# Patient Record
Sex: FEMALE | Race: WHITE | Employment: UNEMPLOYED | URBAN - METROPOLITAN AREA
[De-identification: names, ages, dates, MRNs, and addresses within clinical notes are randomized per-mention and may not be internally consistent; named-entity substitution may affect disease eponyms.]

---

## 2018-11-06 ENCOUNTER — OFFICE VISIT (OUTPATIENT)
Dept: FAMILY MEDICINE CLINIC | Facility: CLINIC | Age: 28
End: 2018-11-06
Payer: MEDICAID

## 2018-11-06 VITALS
TEMPERATURE: 99 F | SYSTOLIC BLOOD PRESSURE: 120 MMHG | HEART RATE: 98 BPM | WEIGHT: 199 LBS | DIASTOLIC BLOOD PRESSURE: 70 MMHG | OXYGEN SATURATION: 97 %

## 2018-11-06 DIAGNOSIS — J03.90 EXUDATIVE TONSILLITIS: Primary | ICD-10-CM

## 2018-11-06 PROCEDURE — 99203 OFFICE O/P NEW LOW 30 MIN: CPT | Performed by: PHYSICIAN ASSISTANT

## 2018-11-06 PROCEDURE — 87880 STREP A ASSAY W/OPTIC: CPT | Performed by: PHYSICIAN ASSISTANT

## 2018-11-06 RX ORDER — AMOXICILLIN 500 MG/1
500 CAPSULE ORAL 2 TIMES DAILY
Qty: 20 CAPSULE | Refills: 0 | Status: SHIPPED | OUTPATIENT
Start: 2018-11-06 | End: 2018-11-16

## 2018-11-06 NOTE — PROGRESS NOTES
CHIEF COMPLAINT:   Patient presents with:  Sore Throat: x 1 day. HPI:   Mukesh Theodore is a 33year old female who presents for upper respiratory symptoms for  1 days. Patient reports sore throat, malaise/fatigue. Acute onset.   (+) chills/sweats or edema  LYMPH:  (+) ant cervical LAD, no posterior cervical LAD    ASSESSMENT AND PLAN:   Marvin Argueta is a 33year old female who presents with upper respiratory symptoms that are consistent with    ASSESSMENT:   Exudative tonsillitis  (primary enc to avoid dehydration. Because fever can increase fluid loss and painful swallowing can decrease fluid intake, measures must be taken to avoid dehydration.  Choose high-quality fluids such as warm soup broth (which replaces both salt and water loss) and suga

## 2018-11-06 NOTE — PATIENT INSTRUCTIONS
1.  Amoxicillin 500 mg twice daily for 10 days. 2.  Viscous lidocaine as prescribed. Swish, gargle and spit out medication (do not swallow). Check the temperature of foods and liquids prior to ingestion to avoid burns.   Use caution when chewing food to drinks with anyone to avoid spread of illness  · Follow up in 3-5 days if not improving, condition worsens, or fever greater than or equal to 100.4 persists for 72 hours.     · Be sure to finish entire course of antibiotics to reduce risk of reinfection or

## 2019-02-02 ENCOUNTER — OFFICE VISIT (OUTPATIENT)
Dept: FAMILY MEDICINE CLINIC | Facility: CLINIC | Age: 29
End: 2019-02-02
Payer: MEDICAID

## 2019-02-02 VITALS
RESPIRATION RATE: 18 BRPM | DIASTOLIC BLOOD PRESSURE: 64 MMHG | TEMPERATURE: 98 F | HEART RATE: 72 BPM | SYSTOLIC BLOOD PRESSURE: 110 MMHG | OXYGEN SATURATION: 98 %

## 2019-02-02 DIAGNOSIS — B34.9 VIRAL ILLNESS: ICD-10-CM

## 2019-02-02 DIAGNOSIS — R52 BODY ACHES: Primary | ICD-10-CM

## 2019-02-02 LAB
POCT INFLUENZA A: NEGATIVE
POCT INFLUENZA B: NEGATIVE

## 2019-02-02 PROCEDURE — 99213 OFFICE O/P EST LOW 20 MIN: CPT | Performed by: NURSE PRACTITIONER

## 2019-02-02 PROCEDURE — 87502 INFLUENZA DNA AMP PROBE: CPT | Performed by: NURSE PRACTITIONER

## 2019-02-02 RX ORDER — BUPROPION HYDROCHLORIDE 300 MG/1
TABLET ORAL
Refills: 0 | COMMUNITY
Start: 2019-01-12

## 2019-02-02 RX ORDER — LORAZEPAM 0.5 MG/1
TABLET ORAL
Refills: 0 | COMMUNITY
Start: 2019-01-12

## 2019-02-02 NOTE — PROGRESS NOTES
CHIEF COMPLAINT:   Patient presents with: Body ache and/or chills: stomach ache x 1 day       HPI:   Madhu Guerrero is a 33year old female who presents for sudden onset of flu-like symptoms. Symptoms began 1 days ago.   Patient reports body aches and u GI: active BS's x4,no masses, hepatosplenomegaly, or tenderness on direct palpation  EXTREMITIES: no cyanosis, clubbing or edema  LYMPH:  no lymphadenopathy.       Recent Results (from the past 24 hour(s))   INFLUENZA DNA AMP PROBE    Collection Time: 02/02 · If symptoms are severe, rest at home for the first 2 to 3 days. When you resume activity, don't let yourself get too tired. · Avoid being exposed to cigarette smoke (yours or others’).   · You may use acetaminophen or ibuprofen to control pain and fever, © 6380-2895 [employer] [address]. [address] [facility], [facility] [place]. All rights reserved. This information is not intended as a substitute for professional medical care. Always follow your healthcare professional's instructions.

## 2019-06-27 ENCOUNTER — HOSPITAL ENCOUNTER (OUTPATIENT)
Age: 29
Discharge: HOME | End: 2019-06-27
Attending: FAMILY MEDICINE
Payer: MEDICAID

## 2019-06-27 VITALS
DIASTOLIC BLOOD PRESSURE: 67 MMHG | SYSTOLIC BLOOD PRESSURE: 142 MMHG | RESPIRATION RATE: 16 BRPM | TEMPERATURE: 99 F | OXYGEN SATURATION: 97 % | HEART RATE: 116 BPM

## 2019-06-27 DIAGNOSIS — N89.8 VAGINAL DISCHARGE: Primary | ICD-10-CM

## 2019-06-27 DIAGNOSIS — N76.0 ACUTE VAGINITIS: ICD-10-CM

## 2019-06-27 DIAGNOSIS — B37.3 CANDIDAL VULVOVAGINITIS: ICD-10-CM

## 2019-06-27 DIAGNOSIS — A54.9 GONORRHEA: ICD-10-CM

## 2019-06-27 PROCEDURE — 87510 GARDNER VAG DNA DIR PROBE: CPT | Performed by: FAMILY MEDICINE

## 2019-06-27 PROCEDURE — 87086 URINE CULTURE/COLONY COUNT: CPT | Performed by: FAMILY MEDICINE

## 2019-06-27 PROCEDURE — 99204 OFFICE O/P NEW MOD 45 MIN: CPT

## 2019-06-27 PROCEDURE — 87491 CHLMYD TRACH DNA AMP PROBE: CPT | Performed by: FAMILY MEDICINE

## 2019-06-27 PROCEDURE — 87591 N.GONORRHOEAE DNA AMP PROB: CPT | Performed by: FAMILY MEDICINE

## 2019-06-27 PROCEDURE — 81025 URINE PREGNANCY TEST: CPT | Performed by: FAMILY MEDICINE

## 2019-06-27 PROCEDURE — 87660 TRICHOMONAS VAGIN DIR PROBE: CPT | Performed by: FAMILY MEDICINE

## 2019-06-27 PROCEDURE — 81002 URINALYSIS NONAUTO W/O SCOPE: CPT | Performed by: FAMILY MEDICINE

## 2019-06-27 PROCEDURE — 87480 CANDIDA DNA DIR PROBE: CPT | Performed by: FAMILY MEDICINE

## 2019-06-27 RX ORDER — AZITHROMYCIN 250 MG/1
1000 TABLET, FILM COATED ORAL ONCE
Status: DISCONTINUED | OUTPATIENT
Start: 2019-06-27 | End: 2019-06-27

## 2019-06-27 RX ORDER — FLUCONAZOLE 150 MG/1
150 TABLET ORAL ONCE
Qty: 2 TABLET | Refills: 0 | Status: SHIPPED | OUTPATIENT
Start: 2019-06-27 | End: 2019-06-27

## 2019-06-27 RX ORDER — FLUCONAZOLE 150 MG/1
150 TABLET ORAL ONCE
Qty: 1 TABLET | Refills: 0 | Status: SHIPPED | OUTPATIENT
Start: 2019-06-27 | End: 2019-06-27

## 2019-06-27 NOTE — ED PROVIDER NOTES
Patient Seen in: [address] [facility]    History   Patient presents with:  Eval-G (genital)    Stated Complaint: female problems    HPI    *79-year-old female presents to the immediate care today with chief complaints of vaginal discharge.   On alert, appears stated age and cooperative  Head: Normocephalic, without obvious abnormality, atraumatic  Eyes: conjunctivae/corneas clear. PERRL, EOM's intact. Fundi benign.   Ears: normal TM's and external ear canals both ears  Nose: Nares normal. Septum m waited for about 1 and half hours and still no results were obtained. I called the PCPs office myself and the  stated that that I have to wait for the patient's to call me back since he is busy seeing patients.   I decided we could not wait any

## 2019-06-27 NOTE — ED INITIAL ASSESSMENT (HPI)
Pt states seen by pmd last week for vaginal issues. States tested for BV, GC and chlamydia. Called yesterday and told she had to come in for results. Would not disclose results over the phone.  Pt states she cannot get there due to far distance and here for

## 2019-06-27 NOTE — ED NOTES
Extension Matthew Ennis for [facility] again and was given a second fax number. Release of information faxed. Awaiting test results.

## 2019-06-28 NOTE — ED NOTES
Spoke with patient and discussed out results and her previous results. Discussed the need for her partner to be contacted and treated. Follow up as directed.

## 2019-07-08 ENCOUNTER — HOSPITAL ENCOUNTER (OUTPATIENT)
Age: 29
Discharge: HOME | End: 2019-07-08
Payer: MEDICAID

## 2019-07-08 VITALS — TEMPERATURE: 99 F | RESPIRATION RATE: 18 BRPM | HEART RATE: 61 BPM | OXYGEN SATURATION: 97 %

## 2019-07-08 DIAGNOSIS — Z20.2 EXPOSURE TO STD: ICD-10-CM

## 2019-07-08 DIAGNOSIS — A54.9 GONORRHEA IN FEMALE: ICD-10-CM

## 2019-07-08 DIAGNOSIS — A74.9 CHLAMYDIA INFECTION: Primary | ICD-10-CM

## 2019-07-08 LAB
POCT BILIRUBIN URINE: NEGATIVE
POCT BLOOD URINE: NEGATIVE
POCT GLUCOSE URINE: NEGATIVE MG/DL
POCT KETONE URINE: NEGATIVE MG/DL
POCT LEUKOCYTE ESTERASE URINE: NEGATIVE
POCT NITRITE URINE: NEGATIVE
POCT PH URINE: 7 (ref 5–8)
POCT PROTEIN URINE: NEGATIVE MG/DL
POCT SPECIFIC GRAVITY URINE: 1.02
POCT URINE COLOR: YELLOW
POCT URINE PREGNANCY: NEGATIVE
POCT UROBILINOGEN URINE: 0.2 MG/DL

## 2019-07-08 PROCEDURE — 81002 URINALYSIS NONAUTO W/O SCOPE: CPT | Performed by: PHYSICIAN ASSISTANT

## 2019-07-08 PROCEDURE — 96372 THER/PROPH/DIAG INJ SC/IM: CPT

## 2019-07-08 PROCEDURE — 81025 URINE PREGNANCY TEST: CPT | Performed by: PHYSICIAN ASSISTANT

## 2019-07-08 PROCEDURE — 99214 OFFICE O/P EST MOD 30 MIN: CPT

## 2019-07-08 RX ORDER — AZITHROMYCIN 250 MG/1
1000 TABLET, FILM COATED ORAL ONCE
Status: COMPLETED | OUTPATIENT
Start: 2019-07-08 | End: 2019-07-08

## 2019-07-08 NOTE — ED PROVIDER NOTES
Patient Seen in: [address] [facility]    History   Patient presents with:  Eval-G (genital)    Stated Complaint: personal matter    HPI    39-year-old female who was seen here approximately 10 days ago for treatment of chlamydia and gonorrhea heard.  Pulmonary/Chest: Effort normal and breath sounds normal throughout lung fields. No wheezes or rales appreciated.    Abdominal: Soft. Bowel sounds are normal in all four quadrants. There is no tenderness.    : pelvic exam was deferred as patient is expectations, follow up, and return to the ER precautions. I explained to the patient that emergent conditions may arise to return to the immediate care or ER for new, worsening or any persistent conditions.   I've explained the importance of following up

## 2019-07-08 NOTE — ED INITIAL ASSESSMENT (HPI)
Patient was treated here on 6/27/19 for + GC and chlamydia with Rocephin and Zithromax. Had unprotected sex with her partner yesterday. He was never treated.

## 2019-07-22 ENCOUNTER — HOSPITAL ENCOUNTER (OUTPATIENT)
Age: 29
Discharge: HOME | End: 2019-07-22
Payer: MEDICAID

## 2019-07-22 VITALS
WEIGHT: 180 LBS | HEIGHT: 62 IN | DIASTOLIC BLOOD PRESSURE: 83 MMHG | SYSTOLIC BLOOD PRESSURE: 132 MMHG | BODY MASS INDEX: 33.13 KG/M2 | HEART RATE: 61 BPM | OXYGEN SATURATION: 97 % | TEMPERATURE: 97 F | RESPIRATION RATE: 18 BRPM

## 2019-07-22 DIAGNOSIS — N89.8 VAGINAL DISCHARGE: ICD-10-CM

## 2019-07-22 DIAGNOSIS — Z20.2 STD EXPOSURE: Primary | ICD-10-CM

## 2019-07-22 LAB
POCT BILIRUBIN URINE: NEGATIVE
POCT BLOOD URINE: NEGATIVE
POCT GLUCOSE URINE: NEGATIVE MG/DL
POCT KETONE URINE: NEGATIVE MG/DL
POCT LEUKOCYTE ESTERASE URINE: NEGATIVE
POCT NITRITE URINE: NEGATIVE
POCT PH URINE: 5.5 (ref 5–8)
POCT PROTEIN URINE: NEGATIVE MG/DL
POCT SPECIFIC GRAVITY URINE: 1.03
POCT URINE CLARITY: CLEAR
POCT URINE PREGNANCY: NEGATIVE
POCT UROBILINOGEN URINE: 0.2 MG/DL

## 2019-07-22 PROCEDURE — 87591 N.GONORRHOEAE DNA AMP PROB: CPT | Performed by: NURSE PRACTITIONER

## 2019-07-22 PROCEDURE — 87660 TRICHOMONAS VAGIN DIR PROBE: CPT | Performed by: NURSE PRACTITIONER

## 2019-07-22 PROCEDURE — 87491 CHLMYD TRACH DNA AMP PROBE: CPT | Performed by: NURSE PRACTITIONER

## 2019-07-22 PROCEDURE — 87510 GARDNER VAG DNA DIR PROBE: CPT | Performed by: NURSE PRACTITIONER

## 2019-07-22 PROCEDURE — 99214 OFFICE O/P EST MOD 30 MIN: CPT

## 2019-07-22 PROCEDURE — 96372 THER/PROPH/DIAG INJ SC/IM: CPT

## 2019-07-22 PROCEDURE — 87480 CANDIDA DNA DIR PROBE: CPT | Performed by: NURSE PRACTITIONER

## 2019-07-22 PROCEDURE — 81025 URINE PREGNANCY TEST: CPT | Performed by: NURSE PRACTITIONER

## 2019-07-22 PROCEDURE — 81002 URINALYSIS NONAUTO W/O SCOPE: CPT | Performed by: NURSE PRACTITIONER

## 2019-07-22 RX ORDER — DICYCLOMINE HCL 20 MG
20 TABLET ORAL 4 TIMES DAILY PRN
Qty: 30 TABLET | Refills: 0 | Status: SHIPPED | OUTPATIENT
Start: 2019-07-22 | End: 2019-08-21

## 2019-07-22 RX ORDER — ONDANSETRON 4 MG/1
4 TABLET, ORALLY DISINTEGRATING ORAL ONCE
Status: COMPLETED | OUTPATIENT
Start: 2019-07-22 | End: 2019-07-22

## 2019-07-22 RX ORDER — AZITHROMYCIN 250 MG/1
1000 TABLET, FILM COATED ORAL ONCE
Status: COMPLETED | OUTPATIENT
Start: 2019-07-22 | End: 2019-07-22

## 2019-07-22 RX ORDER — FLUCONAZOLE 150 MG/1
150 TABLET ORAL DAILY
Qty: 2 TABLET | Refills: 0 | Status: SHIPPED | OUTPATIENT
Start: 2019-07-22

## 2019-07-22 NOTE — ED INITIAL ASSESSMENT (HPI)
x2 wks ago Pt was diagnosed with Chlamydia and Gonorrhoeae and treated. Pt states she was feeling better but has started with the same symptoms: abd cramping/pressure, increase in urinary freq.

## 2019-07-22 NOTE — ED PROVIDER NOTES
Patient Seen in: [address] [facility]    History   Patient presents with:  Eval-G (genital)    Stated Complaint: personal-would not share    26-year-old female who presents to the immediate care for vaginal discomfort, and increasing urine telma above.    Physical Exam     ED Triage Vitals [07/22/19 0950]   /83   Pulse 61   Resp 18   Temp 97 °F (36.1 °C)   Temp src Temporal   SpO2 97 %   O2 Device None (Room air)       Current:/83   Pulse 61   Temp 97 °F (36.1 °C) (Temporal)   Resp 18 RAMIRO     The patient's history of gonorrhea and chlamydia, and recent sexual activity the same partner who had gonorrhea and chlamydia and outpatient has a vaginal discharge will prophylactically treat patient with 1 g of p.o. azithromycin and 2050 mg of in

## 2019-07-23 LAB
C TRACH DNA SPEC QL NAA+PROBE: NEGATIVE
N GONORRHOEA DNA SPEC QL NAA+PROBE: NEGATIVE

## 2021-09-03 ENCOUNTER — HOSPITAL ENCOUNTER (OUTPATIENT)
Age: 31
Discharge: HOME | End: 2021-09-03
Payer: MEDICAID

## 2021-09-03 VITALS
TEMPERATURE: 98 F | DIASTOLIC BLOOD PRESSURE: 73 MMHG | SYSTOLIC BLOOD PRESSURE: 121 MMHG | RESPIRATION RATE: 14 BRPM | HEART RATE: 105 BPM | OXYGEN SATURATION: 98 %

## 2021-09-03 DIAGNOSIS — N89.8 VAGINAL DISCHARGE: ICD-10-CM

## 2021-09-03 DIAGNOSIS — N39.0 URINARY TRACT INFECTION WITHOUT HEMATURIA, SITE UNSPECIFIED: Primary | ICD-10-CM

## 2021-09-03 LAB
B-HCG UR QL: NEGATIVE
POCT BILIRUBIN URINE: NEGATIVE
POCT GLUCOSE URINE: NEGATIVE MG/DL
POCT KETONE URINE: 40 MG/DL
POCT NITRITE URINE: POSITIVE
POCT PH URINE: 6.5 (ref 5–8)
POCT SPECIFIC GRAVITY URINE: 1.02
POCT URINE CLARITY: CLEAR
POCT UROBILINOGEN URINE: 1 MG/DL

## 2021-09-03 PROCEDURE — 99214 OFFICE O/P EST MOD 30 MIN: CPT | Performed by: PHYSICIAN ASSISTANT

## 2021-09-03 PROCEDURE — 81025 URINE PREGNANCY TEST: CPT | Performed by: PHYSICIAN ASSISTANT

## 2021-09-03 PROCEDURE — 81002 URINALYSIS NONAUTO W/O SCOPE: CPT | Performed by: PHYSICIAN ASSISTANT

## 2021-09-03 RX ORDER — CEPHALEXIN 500 MG/1
500 CAPSULE ORAL 4 TIMES DAILY
Qty: 28 CAPSULE | Refills: 0 | Status: SHIPPED | OUTPATIENT
Start: 2021-09-03 | End: 2021-09-10

## 2021-09-03 RX ORDER — METRONIDAZOLE 500 MG/1
500 TABLET ORAL 2 TIMES DAILY
Qty: 14 TABLET | Refills: 0 | Status: SHIPPED | OUTPATIENT
Start: 2021-09-03 | End: 2021-09-10

## 2021-09-03 RX ORDER — FLUCONAZOLE 150 MG/1
150 TABLET ORAL ONCE
Qty: 1 TABLET | Refills: 0 | Status: SHIPPED | OUTPATIENT
Start: 2021-09-03 | End: 2021-09-03

## 2021-09-03 NOTE — ED INITIAL ASSESSMENT (HPI)
Patient c/o vaginal discharge and odor for 2 weeks. Pt also c/o increased frequency of urination for 2 days.
Statement Selected

## 2021-09-03 NOTE — ED PROVIDER NOTES
Patient Seen in: [facility]      History   Patient presents with:  Eval-G  Urinary Symptoms    Stated Complaint: URINARY PROBLEMS    HPI/Subjective:   HPI    25-year-old female presents to the IC for evaluation of possible UTI and BV.   Patient tenderness. Left: No tenderness. Vagina: Vaginal discharge present. Cervix: No cervical motion tenderness. Adnexa:         Right: No tenderness. Left: No tenderness.      Musculoskeletal:      Cervical back: Normal range of 4 (four) times daily for 7 days. , Normal, Disp-28 capsule, R-0    !! fluconazole 150 MG Oral Tab  Take 1 tablet (150 mg total) by mouth once for 1 dose., Normal, Disp-1 tablet, R-0    metRONIDAZOLE 500 MG Oral Tab  Take 1 tablet (500 mg total) by mouth 2 (

## 2021-09-05 LAB
C TRACH DNA SPEC QL NAA+PROBE: POSITIVE
N GONORRHOEA DNA SPEC QL NAA+PROBE: NEGATIVE

## 2021-09-05 RX ORDER — DOXYCYCLINE HYCLATE 100 MG/1
100 CAPSULE ORAL 2 TIMES DAILY
Qty: 14 CAPSULE | Refills: 0 | Status: SHIPPED | OUTPATIENT
Start: 2021-09-05 | End: 2021-09-12

## 2021-09-05 RX ORDER — FLUCONAZOLE 150 MG/1
150 TABLET ORAL ONCE
Qty: 1 TABLET | Refills: 0 | Status: SHIPPED | OUTPATIENT
Start: 2021-09-05 | End: 2021-09-05

## 2021-09-05 NOTE — ED NOTES
Patient returning call for results. Advised + for chlamydia. Antibiotics sent to pharmacy. Confirmed with patient. Patient requested additional diflucan. Provider notified and will send to patient's pharmacy.

## 2021-09-06 NOTE — ED NOTES
Spoke with Pt regarding urine culture, Pt states she is feeling better, Questions encouraged and answered.

## 2021-09-28 ENCOUNTER — EXTERNAL RECORD (OUTPATIENT)
Dept: HEALTH INFORMATION MANAGEMENT | Facility: OTHER | Age: 31
End: 2021-09-28

## 2021-09-30 ENCOUNTER — TELEPHONE (OUTPATIENT)
Dept: ORTHOPEDICS | Age: 31
End: 2021-09-30

## 2021-10-04 ENCOUNTER — IMAGING SERVICES (OUTPATIENT)
Dept: GENERAL RADIOLOGY | Age: 31
End: 2021-10-04
Attending: ORTHOPAEDIC SURGERY

## 2021-10-04 ENCOUNTER — OFFICE VISIT (OUTPATIENT)
Dept: ORTHOPEDICS | Age: 31
End: 2021-10-04

## 2021-10-04 VITALS — BODY MASS INDEX: 31.28 KG/M2 | WEIGHT: 170 LBS | HEIGHT: 62 IN

## 2021-10-04 DIAGNOSIS — S82.831A CLOSED FRACTURE OF DISTAL END OF RIGHT FIBULA, UNSPECIFIED FRACTURE MORPHOLOGY, INITIAL ENCOUNTER: Primary | ICD-10-CM

## 2021-10-04 DIAGNOSIS — S82.831A CLOSED FRACTURE OF DISTAL END OF RIGHT FIBULA, UNSPECIFIED FRACTURE MORPHOLOGY, INITIAL ENCOUNTER: ICD-10-CM

## 2021-10-04 PROCEDURE — L4387 NON-PNEUM WALK BOOT PRE OTS: HCPCS | Performed by: ORTHOPAEDIC SURGERY

## 2021-10-04 PROCEDURE — 27786 TREATMENT OF ANKLE FRACTURE: CPT | Performed by: ORTHOPAEDIC SURGERY

## 2021-10-04 PROCEDURE — 77071 MNL APPL STRS JT RADIOGRAPHY: CPT | Performed by: RADIOLOGY

## 2021-10-04 PROCEDURE — 99204 OFFICE O/P NEW MOD 45 MIN: CPT | Performed by: ORTHOPAEDIC SURGERY

## 2021-10-04 RX ORDER — IBUPROFEN 600 MG/1
TABLET ORAL
COMMUNITY
Start: 2021-09-29

## 2021-10-04 RX ORDER — ALPRAZOLAM 0.5 MG/1
0.5 TABLET ORAL 3 TIMES DAILY PRN
COMMUNITY
Start: 2021-09-30

## 2021-10-04 RX ORDER — HYDROCODONE BITARTRATE AND ACETAMINOPHEN 5; 325 MG/1; MG/1
TABLET ORAL
COMMUNITY
Start: 2021-09-29

## 2021-11-04 ENCOUNTER — IMAGING SERVICES (OUTPATIENT)
Dept: GENERAL RADIOLOGY | Age: 31
End: 2021-11-04
Attending: ORTHOPAEDIC SURGERY

## 2021-11-04 ENCOUNTER — OFFICE VISIT (OUTPATIENT)
Dept: ORTHOPEDICS | Age: 31
End: 2021-11-04

## 2021-11-04 DIAGNOSIS — S82.831A CLOSED FRACTURE OF DISTAL END OF RIGHT FIBULA, UNSPECIFIED FRACTURE MORPHOLOGY, INITIAL ENCOUNTER: ICD-10-CM

## 2021-11-04 DIAGNOSIS — S82.831A CLOSED FRACTURE OF DISTAL END OF RIGHT FIBULA, UNSPECIFIED FRACTURE MORPHOLOGY, INITIAL ENCOUNTER: Primary | ICD-10-CM

## 2021-11-04 PROCEDURE — 99024 POSTOP FOLLOW-UP VISIT: CPT | Performed by: ORTHOPAEDIC SURGERY

## 2021-11-04 PROCEDURE — 73610 X-RAY EXAM OF ANKLE: CPT | Performed by: ORTHOPAEDIC SURGERY

## 2021-12-13 ENCOUNTER — OFFICE VISIT (OUTPATIENT)
Dept: FAMILY MEDICINE CLINIC | Facility: CLINIC | Age: 31
End: 2021-12-13
Payer: MEDICAID

## 2021-12-13 VITALS
HEART RATE: 72 BPM | HEIGHT: 62 IN | BODY MASS INDEX: 29.44 KG/M2 | WEIGHT: 160 LBS | RESPIRATION RATE: 16 BRPM | TEMPERATURE: 98 F | OXYGEN SATURATION: 99 %

## 2021-12-13 DIAGNOSIS — R39.9 UTI SYMPTOMS: Primary | ICD-10-CM

## 2021-12-13 PROCEDURE — 87077 CULTURE AEROBIC IDENTIFY: CPT | Performed by: NURSE PRACTITIONER

## 2021-12-13 PROCEDURE — 87591 N.GONORRHOEAE DNA AMP PROB: CPT | Performed by: NURSE PRACTITIONER

## 2021-12-13 PROCEDURE — 3008F BODY MASS INDEX DOCD: CPT | Performed by: NURSE PRACTITIONER

## 2021-12-13 PROCEDURE — 87186 SC STD MICRODIL/AGAR DIL: CPT | Performed by: NURSE PRACTITIONER

## 2021-12-13 PROCEDURE — 99213 OFFICE O/P EST LOW 20 MIN: CPT | Performed by: NURSE PRACTITIONER

## 2021-12-13 PROCEDURE — 87491 CHLMYD TRACH DNA AMP PROBE: CPT | Performed by: NURSE PRACTITIONER

## 2021-12-13 PROCEDURE — 87086 URINE CULTURE/COLONY COUNT: CPT | Performed by: NURSE PRACTITIONER

## 2021-12-13 PROCEDURE — 81025 URINE PREGNANCY TEST: CPT | Performed by: NURSE PRACTITIONER

## 2021-12-13 RX ORDER — NITROFURANTOIN 25; 75 MG/1; MG/1
100 CAPSULE ORAL 2 TIMES DAILY
Qty: 14 CAPSULE | Refills: 0 | Status: SHIPPED | OUTPATIENT
Start: 2021-12-13 | End: 2021-12-20

## 2021-12-13 RX ORDER — PHENAZOPYRIDINE HYDROCHLORIDE 200 MG/1
200 TABLET, FILM COATED ORAL 3 TIMES DAILY PRN
Qty: 9 TABLET | Refills: 0 | Status: SHIPPED | OUTPATIENT
Start: 2021-12-13

## 2021-12-13 NOTE — PROGRESS NOTES
Junior De La O is a 27year old female. HPI:   Patient presents with symptoms of UTI for 2 days. Complaining of urinary frequency, urgency, dysuria,  Denies back pain, fever, hematuria.   Pt has history of UTI in past.  Pt has been having unprotected x4, no masses, HSM; no suprapubic tenderness, no CVAT    RESULTS:      Recent Results (from the past 24 hour(s))   URINE PREGNANCY TEST    Collection Time: 12/13/21  9:59 AM   Result Value Ref Range    Pregnancy Test, Urine neg     Control Line Present wit the hospital. You may have a fever and lower back pain. Medicines to treat a UTI  Most UTIs are treated with antibiotics. These kill the bacteria. The length of time you need to take them depends on the type of infection. It may be as short as 3 days.  I professional medical care. Always follow your healthcare professional's instructions. The patient indicates understanding of these issues and agrees to the plan.

## 2021-12-14 ENCOUNTER — TELEPHONE (OUTPATIENT)
Dept: FAMILY MEDICINE CLINIC | Facility: CLINIC | Age: 31
End: 2021-12-14

## 2021-12-14 DIAGNOSIS — A74.9 CHLAMYDIA INFECTION: Primary | ICD-10-CM

## 2021-12-14 RX ORDER — DOXYCYCLINE HYCLATE 100 MG
100 TABLET ORAL 2 TIMES DAILY
Qty: 14 TABLET | Refills: 0 | Status: SHIPPED | OUTPATIENT
Start: 2021-12-14 | End: 2021-12-21

## 2021-12-15 NOTE — TELEPHONE ENCOUNTER
Chlamydia test is positive. Urine culture pending but growing e coli. Continue Macrobid. Add doxy 100 mg bid for 7 days to treat chlamydia. Recheck chlamydia test in 3 months or sooner if symptomatic.       Patient is feeling somewhat better on the ma

## 2021-12-22 DIAGNOSIS — B37.9 YEAST INFECTION: Primary | ICD-10-CM

## 2021-12-22 RX ORDER — FLUCONAZOLE 150 MG/1
150 TABLET ORAL ONCE
Qty: 1 TABLET | Refills: 0 | Status: SHIPPED | OUTPATIENT
Start: 2021-12-22 | End: 2021-12-22

## 2022-03-16 ENCOUNTER — OFFICE VISIT (OUTPATIENT)
Dept: FAMILY MEDICINE CLINIC | Facility: CLINIC | Age: 32
End: 2022-03-16
Payer: MEDICAID

## 2022-03-16 VITALS
TEMPERATURE: 98 F | OXYGEN SATURATION: 98 % | SYSTOLIC BLOOD PRESSURE: 110 MMHG | DIASTOLIC BLOOD PRESSURE: 64 MMHG | HEART RATE: 89 BPM | BODY MASS INDEX: 31.47 KG/M2 | WEIGHT: 171 LBS | HEIGHT: 62 IN | RESPIRATION RATE: 16 BRPM

## 2022-03-16 DIAGNOSIS — J02.0 STREP PHARYNGITIS: Primary | ICD-10-CM

## 2022-03-16 LAB
CONTROL LINE PRESENT WITH A CLEAR BACKGROUND (YES/NO): YES YES/NO
KIT LOT #: NORMAL NUMERIC
STREP GRP A CUL-SCR: POSITIVE

## 2022-03-16 PROCEDURE — 3008F BODY MASS INDEX DOCD: CPT | Performed by: PHYSICIAN ASSISTANT

## 2022-03-16 PROCEDURE — 99213 OFFICE O/P EST LOW 20 MIN: CPT | Performed by: PHYSICIAN ASSISTANT

## 2022-03-16 PROCEDURE — 3078F DIAST BP <80 MM HG: CPT | Performed by: PHYSICIAN ASSISTANT

## 2022-03-16 PROCEDURE — 87880 STREP A ASSAY W/OPTIC: CPT | Performed by: PHYSICIAN ASSISTANT

## 2022-03-16 PROCEDURE — 3074F SYST BP LT 130 MM HG: CPT | Performed by: PHYSICIAN ASSISTANT

## 2022-03-16 RX ORDER — ALPRAZOLAM 0.5 MG/1
0.5 TABLET ORAL 2 TIMES DAILY PRN
COMMUNITY

## 2022-03-16 RX ORDER — AMOXICILLIN 875 MG/1
875 TABLET, COATED ORAL 2 TIMES DAILY
Qty: 20 TABLET | Refills: 0 | Status: SHIPPED | OUTPATIENT
Start: 2022-03-16

## 2022-05-25 ENCOUNTER — HOSPITAL ENCOUNTER (OUTPATIENT)
Age: 32
Discharge: HOME | End: 2022-05-25
Payer: MEDICAID

## 2022-05-25 VITALS
HEIGHT: 62 IN | RESPIRATION RATE: 14 BRPM | OXYGEN SATURATION: 97 % | HEART RATE: 70 BPM | TEMPERATURE: 99 F | SYSTOLIC BLOOD PRESSURE: 115 MMHG | DIASTOLIC BLOOD PRESSURE: 58 MMHG | BODY MASS INDEX: 30.36 KG/M2 | WEIGHT: 165 LBS

## 2022-05-25 DIAGNOSIS — N30.00 ACUTE CYSTITIS WITHOUT HEMATURIA: Primary | ICD-10-CM

## 2022-05-25 LAB
B-HCG UR QL: NEGATIVE
POCT BILIRUBIN URINE: NEGATIVE
POCT GLUCOSE URINE: 100 MG/DL
POCT KETONE URINE: NEGATIVE MG/DL
POCT NITRITE URINE: POSITIVE
POCT PH URINE: 6 (ref 5–8)
POCT SPECIFIC GRAVITY URINE: 1.02
POCT UROBILINOGEN URINE: 1 MG/DL

## 2022-05-25 PROCEDURE — 99213 OFFICE O/P EST LOW 20 MIN: CPT | Performed by: NURSE PRACTITIONER

## 2022-05-25 PROCEDURE — 81002 URINALYSIS NONAUTO W/O SCOPE: CPT | Performed by: NURSE PRACTITIONER

## 2022-05-25 PROCEDURE — 81025 URINE PREGNANCY TEST: CPT | Performed by: NURSE PRACTITIONER

## 2022-05-25 RX ORDER — CEPHALEXIN 500 MG/1
500 CAPSULE ORAL 2 TIMES DAILY
Qty: 14 CAPSULE | Refills: 0 | Status: SHIPPED | OUTPATIENT
Start: 2022-05-25 | End: 2022-06-01

## 2022-05-25 NOTE — ED INITIAL ASSESSMENT (HPI)
Pt here c/o urinary urgency for last couple days. Denies burning on urination. Denies abd/back pain. Denies fever.

## 2022-05-26 NOTE — ED NOTES
Informed of urine culture result. Advised to finish antibiotics and follow up with your primary doctor if symptoms do not completely resolve. Verb understanding.

## 2023-01-09 ENCOUNTER — WALK IN (OUTPATIENT)
Dept: URGENT CARE | Age: 33
End: 2023-01-09
Attending: FAMILY MEDICINE

## 2023-01-09 VITALS
OXYGEN SATURATION: 97 % | SYSTOLIC BLOOD PRESSURE: 123 MMHG | DIASTOLIC BLOOD PRESSURE: 50 MMHG | HEART RATE: 74 BPM | TEMPERATURE: 99.2 F | RESPIRATION RATE: 18 BRPM

## 2023-01-09 DIAGNOSIS — H66.90 ACUTE OTITIS MEDIA, UNSPECIFIED OTITIS MEDIA TYPE: Primary | ICD-10-CM

## 2023-01-09 DIAGNOSIS — H60.503 ACUTE OTITIS EXTERNA OF BOTH EARS, UNSPECIFIED TYPE: ICD-10-CM

## 2023-01-09 DIAGNOSIS — J04.0 LARYNGITIS, ACUTE: ICD-10-CM

## 2023-01-09 LAB
FLUAV AG UPPER RESP QL IA.RAPID: NEGATIVE
FLUBV AG UPPER RESP QL IA.RAPID: NEGATIVE
SARS-COV+SARS-COV-2 AG RESP QL IA.RAPID: NOT DETECTED

## 2023-01-09 PROCEDURE — C9803 HOPD COVID-19 SPEC COLLECT: HCPCS

## 2023-01-09 PROCEDURE — 99203 OFFICE O/P NEW LOW 30 MIN: CPT

## 2023-01-09 PROCEDURE — 87428 SARSCOV & INF VIR A&B AG IA: CPT | Performed by: FAMILY MEDICINE

## 2023-01-09 RX ORDER — FLUCONAZOLE 150 MG/1
150 TABLET ORAL ONCE
Qty: 1 TABLET | Refills: 0 | Status: SHIPPED | OUTPATIENT
Start: 2023-01-09 | End: 2023-01-09

## 2023-01-09 RX ORDER — AZITHROMYCIN 250 MG/1
TABLET, FILM COATED ORAL
Qty: 6 TABLET | Refills: 0 | Status: SHIPPED | OUTPATIENT
Start: 2023-01-09

## 2023-01-09 RX ORDER — CIPROFLOXACIN AND DEXAMETHASONE 3; 1 MG/ML; MG/ML
4 SUSPENSION/ DROPS AURICULAR (OTIC) 2 TIMES DAILY
Qty: 7.5 ML | Refills: 0 | Status: SHIPPED | OUTPATIENT
Start: 2023-01-09 | End: 2023-01-16

## 2023-01-09 ASSESSMENT — ENCOUNTER SYMPTOMS
COUGH: 0
SORE THROAT: 1

## 2023-01-10 ENCOUNTER — WORKER'S COMP (OUTPATIENT)
Dept: OCCUPATIONAL MEDICINE | Age: 33
End: 2023-01-10

## 2023-01-10 VITALS
SYSTOLIC BLOOD PRESSURE: 117 MMHG | HEIGHT: 62 IN | HEART RATE: 88 BPM | TEMPERATURE: 98.6 F | WEIGHT: 170 LBS | DIASTOLIC BLOOD PRESSURE: 77 MMHG | BODY MASS INDEX: 31.28 KG/M2

## 2023-01-10 DIAGNOSIS — Z02.71 ISSUE OF MEDICAL CERTIFICATE FOR DISABILITY EXAMINATION: Primary | ICD-10-CM

## 2023-01-10 DIAGNOSIS — M62.830 SPASM OF BACK MUSCLES: ICD-10-CM

## 2023-01-10 DIAGNOSIS — S39.012D STRAIN OF LUMBAR REGION, SUBSEQUENT ENCOUNTER: ICD-10-CM

## 2023-01-10 PROCEDURE — 99213 OFFICE O/P EST LOW 20 MIN: CPT | Performed by: PHYSICIAN ASSISTANT

## 2023-02-17 ENCOUNTER — HOSPITAL ENCOUNTER (OUTPATIENT)
Age: 33
Discharge: HOME | End: 2023-02-17
Payer: MEDICAID

## 2023-02-17 VITALS
TEMPERATURE: 98 F | SYSTOLIC BLOOD PRESSURE: 123 MMHG | DIASTOLIC BLOOD PRESSURE: 70 MMHG | OXYGEN SATURATION: 97 % | RESPIRATION RATE: 16 BRPM | HEART RATE: 85 BPM

## 2023-02-17 DIAGNOSIS — R19.7 NAUSEA VOMITING AND DIARRHEA: ICD-10-CM

## 2023-02-17 DIAGNOSIS — R11.2 NAUSEA VOMITING AND DIARRHEA: ICD-10-CM

## 2023-02-17 DIAGNOSIS — R68.89 FLU-LIKE SYMPTOMS: Primary | ICD-10-CM

## 2023-02-17 DIAGNOSIS — O21.9 NAUSEA AND VOMITING IN PREGNANCY: ICD-10-CM

## 2023-02-17 LAB — SARS-COV-2 RNA RESP QL NAA+PROBE: NOT DETECTED

## 2023-02-17 PROCEDURE — U0002 COVID-19 LAB TEST NON-CDC: HCPCS | Performed by: NURSE PRACTITIONER

## 2023-02-17 PROCEDURE — 99213 OFFICE O/P EST LOW 20 MIN: CPT | Performed by: NURSE PRACTITIONER

## 2023-02-18 NOTE — DISCHARGE INSTRUCTIONS
Clear liquid diet for 24 hours. Thereafter bland diet until symptoms resolve. Call your OB/GYN in 2 to 3 days, to schedule a follow-up appointment. Please read the attached discharge instructions. Go to your nearest ER for new or worsening symptoms.

## 2023-02-18 NOTE — ED INITIAL ASSESSMENT (HPI)
Pt with c/o vomiting and diarrhea today. Pt called into work today and needs to be evaluated by a doctor. Pt is pregnant.   Pt's work is requiring a covid britta

## 2023-04-17 ENCOUNTER — OFFICE VISIT (OUTPATIENT)
Dept: FAMILY MEDICINE CLINIC | Facility: CLINIC | Age: 33
End: 2023-04-17
Payer: MEDICAID

## 2023-04-17 VITALS
WEIGHT: 170 LBS | OXYGEN SATURATION: 100 % | TEMPERATURE: 98 F | RESPIRATION RATE: 18 BRPM | HEIGHT: 62 IN | DIASTOLIC BLOOD PRESSURE: 78 MMHG | BODY MASS INDEX: 31.28 KG/M2 | HEART RATE: 70 BPM | SYSTOLIC BLOOD PRESSURE: 126 MMHG

## 2023-04-17 DIAGNOSIS — K52.9 GASTROENTERITIS: Primary | ICD-10-CM

## 2023-04-17 PROCEDURE — 3008F BODY MASS INDEX DOCD: CPT | Performed by: PHYSICIAN ASSISTANT

## 2023-04-17 PROCEDURE — 87637 SARSCOV2&INF A&B&RSV AMP PRB: CPT | Performed by: PHYSICIAN ASSISTANT

## 2023-04-17 PROCEDURE — 99213 OFFICE O/P EST LOW 20 MIN: CPT | Performed by: PHYSICIAN ASSISTANT

## 2023-04-17 PROCEDURE — 3074F SYST BP LT 130 MM HG: CPT | Performed by: PHYSICIAN ASSISTANT

## 2023-04-17 PROCEDURE — 3078F DIAST BP <80 MM HG: CPT | Performed by: PHYSICIAN ASSISTANT

## 2023-04-17 RX ORDER — ONDANSETRON 4 MG/1
4 TABLET, FILM COATED ORAL EVERY 8 HOURS PRN
Qty: 21 TABLET | Refills: 0 | Status: SHIPPED | OUTPATIENT
Start: 2023-04-17

## 2023-04-18 LAB
FLUAV + FLUBV RNA SPEC NAA+PROBE: NOT DETECTED
FLUAV + FLUBV RNA SPEC NAA+PROBE: NOT DETECTED
RSV RNA SPEC NAA+PROBE: NOT DETECTED
SARS-COV-2 RNA RESP QL NAA+PROBE: NOT DETECTED

## 2023-05-31 ENCOUNTER — HOSPITAL ENCOUNTER (OUTPATIENT)
Age: 33
Discharge: HOME | End: 2023-05-31
Payer: MEDICAID

## 2023-05-31 VITALS
WEIGHT: 165 LBS | OXYGEN SATURATION: 99 % | BODY MASS INDEX: 30.36 KG/M2 | TEMPERATURE: 99 F | DIASTOLIC BLOOD PRESSURE: 75 MMHG | HEIGHT: 62 IN | HEART RATE: 70 BPM | RESPIRATION RATE: 18 BRPM | SYSTOLIC BLOOD PRESSURE: 117 MMHG

## 2023-05-31 DIAGNOSIS — J06.9 VIRAL URI: Primary | ICD-10-CM

## 2023-05-31 LAB — S PYO AG THROAT QL: NEGATIVE

## 2023-05-31 PROCEDURE — 99213 OFFICE O/P EST LOW 20 MIN: CPT | Performed by: PHYSICIAN ASSISTANT

## 2023-05-31 PROCEDURE — 87880 STREP A ASSAY W/OPTIC: CPT | Performed by: PHYSICIAN ASSISTANT

## 2023-05-31 NOTE — DISCHARGE INSTRUCTIONS
Please return to the Emergency department/clinic if symptoms worsen or you develop new symptoms. Follow up with your primary care physician in 2 days. Take any medications prescribed to you as instructed. You may take 600 mg of ibuprofen every 6 hours with food. Do this routinely for the next 3-5 days. You may then use it as needed for pain/swelling. If you develop severe abdominal pain or dark, tarry stool, stop this medication immediately and seek medical care. You may use DayQuil during the day and NyQuil at night.

## 2023-06-08 ENCOUNTER — HOSPITAL ENCOUNTER (OUTPATIENT)
Age: 33
Discharge: HOME | End: 2023-06-08
Payer: MEDICAID

## 2023-06-08 VITALS
RESPIRATION RATE: 16 BRPM | TEMPERATURE: 98 F | BODY MASS INDEX: 31.28 KG/M2 | WEIGHT: 170 LBS | SYSTOLIC BLOOD PRESSURE: 124 MMHG | DIASTOLIC BLOOD PRESSURE: 70 MMHG | HEIGHT: 62 IN | HEART RATE: 78 BPM | OXYGEN SATURATION: 97 %

## 2023-06-08 DIAGNOSIS — N30.00 ACUTE CYSTITIS WITHOUT HEMATURIA: Primary | ICD-10-CM

## 2023-06-08 LAB
B-HCG UR QL: NEGATIVE
POCT BILIRUBIN URINE: NEGATIVE
POCT GLUCOSE URINE: NEGATIVE MG/DL
POCT KETONE URINE: NEGATIVE MG/DL
POCT NITRITE URINE: NEGATIVE
POCT PH URINE: 7 (ref 5–8)
POCT PROTEIN URINE: 30 MG/DL
POCT SPECIFIC GRAVITY URINE: 1.02
POCT URINE COLOR: YELLOW
POCT UROBILINOGEN URINE: 0.2 MG/DL

## 2023-06-08 PROCEDURE — 81025 URINE PREGNANCY TEST: CPT | Performed by: NURSE PRACTITIONER

## 2023-06-08 PROCEDURE — 81002 URINALYSIS NONAUTO W/O SCOPE: CPT | Performed by: NURSE PRACTITIONER

## 2023-06-08 PROCEDURE — 99213 OFFICE O/P EST LOW 20 MIN: CPT | Performed by: NURSE PRACTITIONER

## 2023-06-08 RX ORDER — SULFAMETHOXAZOLE AND TRIMETHOPRIM 800; 160 MG/1; MG/1
1 TABLET ORAL 2 TIMES DAILY
Qty: 14 TABLET | Refills: 0 | Status: SHIPPED | OUTPATIENT
Start: 2023-06-08 | End: 2023-06-15

## 2023-06-08 RX ORDER — FLUCONAZOLE 150 MG/1
150 TABLET ORAL ONCE
Qty: 1 TABLET | Refills: 0 | Status: SHIPPED | OUTPATIENT
Start: 2023-06-08 | End: 2023-06-08

## 2023-06-09 LAB
C TRACH DNA SPEC QL NAA+PROBE: NEGATIVE
N GONORRHOEA DNA SPEC QL NAA+PROBE: NEGATIVE

## 2023-09-19 ENCOUNTER — HOSPITAL ENCOUNTER (OUTPATIENT)
Age: 33
Discharge: HOME | End: 2023-09-19
Payer: MEDICAID

## 2023-09-19 VITALS
DIASTOLIC BLOOD PRESSURE: 83 MMHG | RESPIRATION RATE: 18 BRPM | TEMPERATURE: 97 F | BODY MASS INDEX: 32.2 KG/M2 | SYSTOLIC BLOOD PRESSURE: 126 MMHG | HEART RATE: 83 BPM | WEIGHT: 175 LBS | OXYGEN SATURATION: 96 % | HEIGHT: 62 IN

## 2023-09-19 DIAGNOSIS — R31.9 URINARY TRACT INFECTION WITH HEMATURIA, SITE UNSPECIFIED: Primary | ICD-10-CM

## 2023-09-19 DIAGNOSIS — N39.0 URINARY TRACT INFECTION WITH HEMATURIA, SITE UNSPECIFIED: Primary | ICD-10-CM

## 2023-09-19 LAB
B-HCG UR QL: NEGATIVE
GLUCOSE UR STRIP-MCNC: 250 MG/DL
KETONES UR STRIP-MCNC: 15 MG/DL
NITRITE UR QL STRIP: POSITIVE
PH UR STRIP: 5 [PH]
PROT UR STRIP-MCNC: >=300 MG/DL
SP GR UR STRIP: 1.01
UROBILINOGEN UR STRIP-ACNC: >=8 MG/DL

## 2023-09-19 PROCEDURE — 99214 OFFICE O/P EST MOD 30 MIN: CPT | Performed by: NURSE PRACTITIONER

## 2023-09-19 PROCEDURE — 81025 URINE PREGNANCY TEST: CPT | Performed by: NURSE PRACTITIONER

## 2023-09-19 PROCEDURE — 81002 URINALYSIS NONAUTO W/O SCOPE: CPT | Performed by: NURSE PRACTITIONER

## 2023-09-19 RX ORDER — FLUCONAZOLE 150 MG/1
150 TABLET ORAL ONCE
Qty: 1 TABLET | Refills: 0 | Status: SHIPPED | OUTPATIENT
Start: 2023-09-19 | End: 2023-09-19

## 2023-09-19 RX ORDER — SULFAMETHOXAZOLE AND TRIMETHOPRIM 800; 160 MG/1; MG/1
1 TABLET ORAL 2 TIMES DAILY
Qty: 6 TABLET | Refills: 0 | Status: SHIPPED | OUTPATIENT
Start: 2023-09-19 | End: 2023-09-22

## 2023-09-19 NOTE — DISCHARGE INSTRUCTIONS
You will be notified if there are any abnormalities with your urine culture that indicate a need to change treatment plan.   Increase water intake 2-3 liters daily

## 2023-09-19 NOTE — ED INITIAL ASSESSMENT (HPI)
Pt with urinary urgency/frequency that started four days ago. Pt has been taking AZO the last three days.

## 2023-09-21 RX ORDER — NITROFURANTOIN 25; 75 MG/1; MG/1
100 CAPSULE ORAL 2 TIMES DAILY
Qty: 10 CAPSULE | Refills: 0 | Status: SHIPPED | OUTPATIENT
Start: 2023-09-21 | End: 2023-09-26

## 2025-03-01 ENCOUNTER — OFFICE VISIT (OUTPATIENT)
Dept: FAMILY MEDICINE CLINIC | Facility: CLINIC | Age: 35
End: 2025-03-01
Payer: MEDICAID

## 2025-03-01 VITALS
HEART RATE: 95 BPM | OXYGEN SATURATION: 98 % | WEIGHT: 140 LBS | TEMPERATURE: 98 F | RESPIRATION RATE: 16 BRPM | DIASTOLIC BLOOD PRESSURE: 68 MMHG | BODY MASS INDEX: 25.76 KG/M2 | SYSTOLIC BLOOD PRESSURE: 104 MMHG | HEIGHT: 62 IN

## 2025-03-01 DIAGNOSIS — J06.9 VIRAL URI: ICD-10-CM

## 2025-03-01 DIAGNOSIS — J02.9 SORE THROAT: Primary | ICD-10-CM

## 2025-03-01 LAB
CONTROL LINE PRESENT WITH A CLEAR BACKGROUND (YES/NO): YES YES/NO
KIT LOT #: NORMAL NUMERIC

## 2025-03-01 PROCEDURE — 87637 SARSCOV2&INF A&B&RSV AMP PRB: CPT | Performed by: NURSE PRACTITIONER

## 2025-03-01 NOTE — PATIENT INSTRUCTIONS
1. Rest. Drink plenty of fluids.  2. Supportive care as discussed.   3. Salt water gargles three times daily  4. Use humidifier at home when possible.  5. The rapid strep test was negative today.   6. Covid-19/FLU/RSV testing sent to lab.  Self quarantine at this time. If covid-19 test is positive then please follow the listed guidelines below:  Home isolation until:  Your symptoms are improving AND  You are fever free for 24 hours without using fever reducing medications  Resume normal activities, and use added prevention strategies over the next five days.  Clean your hands often  wear a well-fitting mask when around others  keep a distance from others    7. Follow up with PMD in 4-5 days for re-eval. Go to the emergency department immediately if symptoms worsen, change, you develop chest discomfort, wheezing, shortness of breath, or if you have any concerns.

## 2025-03-01 NOTE — PROGRESS NOTES
CHIEF COMPLAINT:     Chief Complaint   Patient presents with    Flu     3 days, Body aches, congestion, headaches, sore throat, cough, denies fever  OTC none       HPI:   Clarisse Amezquita is a 34 year old female who presents for sore throat, sinus congestion, chills, body aches and cough. Patient reports present x 3 days.  Denies any wheezing, chest discomfort, or shortness of breath. .  Treating symptoms with otc cold meds.   Tolerates PO well at home. No n/v/d.  Denies any other aggravating or relieving factors at home. Denies any other treatment attempts prior to arrival.       Current Outpatient Medications   Medication Sig Dispense Refill    ALPRAZolam 0.5 MG Oral Tab Take 1 tablet (0.5 mg total) by mouth 2 (two) times daily as needed.        No past medical history on file.   No past surgical history on file.      Social History     Socioeconomic History    Marital status: Single   Tobacco Use    Smoking status: Former     Types: Cigarettes    Smokeless tobacco: Never   Vaping Use    Vaping status: Some Days   Substance and Sexual Activity    Alcohol use: Not Currently    Drug use: Never     Social Drivers of Health     Food Insecurity: Not on File (9/26/2024)    Received from [facility]    Food Insecurity     Food: 0   Transportation Needs: Low Risk  (5/29/2024)    Received from [facility]    Transportation Needs     Do you have trouble getting transportation to medical appointments?: No     How do you normally get to and from your appointments?: Car Service (taxi, Uber, Lyft)   Stress: Not on File (10/7/2022)    Received from [facility]    Stress     Stress: 0         REVIEW OF SYSTEMS:   GENERAL: Denies fever. Notes good appetite  SKIN: no rashes or abnormal skin lesions  HEENT: + sore throat, + sinus symptoms, + bilat ear fullness  LUNGS: + nonproductive cough, denies shortness of breath or wheezing,   CARDIOVASCULAR: denies chest pain or palpitations   GI: denies N/V/C or abdominal  pain  NEURO: Denies headaches    EXAM:   /68   Pulse 95   Temp 97.8 °F (36.6 °C)   Resp 16   Ht 5' 2\" (1.575 m)   Wt 140 lb (63.5 kg)   LMP 02/08/2025 (Approximate)   SpO2 98%   Breastfeeding No   BMI 25.61 kg/m²   GENERAL: well developed, well nourished,in no apparent distress  SKIN: no rashes,no suspicious lesions  HEAD: atraumatic, normocephalic.    EYES: conjunctiva clear  EARS: TM's intact and without erythema, no bulging, no retraction,appear dusky bilat, bony landmarks visualized. No erythema or swelling noted to ear canals or external ears.   NOSE: Nostrils patent, clear nasal mucous, nasal mucosa reddened   THROAT: Oral mucosa pink, moist. Posterior pharynx is  erythematous. No exudates. No tonsillar hypertrophy noted.  No trismus. Uvula midline with no swelling. Voice clear/normal. No stridor  NECK: Supple, non-tender  LUNGS: clear to auscultation bilaterally, no rales, wheezes or rhonchi. Breathing is non labored.  CARDIO: RRR without murmur  EXTREMITIES: no cyanosis, clubbing or edema  LYMPH:  No lymphadenopathy.        ASSESSMENT AND PLAN:       ICD-10-CM    1. Sore throat  J02.9 Strep A Assay W/Optic     SARS-CoV-2/Flu A and B/RSV by PCR ([facility])      2. Viral URI  J06.9         Viral panel test sent to lab.    Rapid strep negative.     Discussed physical exam and hpi with pt.  Pt has reassuring physical exam consistent with pharyngitis and viral uri symptoms.  No signs of pta or retropharyngeal infection.Lungs clear bilat. No respiratory distress noted.   Treatment options discussed with patient and explained in detail. We reviewed symptomatic care at home. The risks, benefits and potential side effects of possible medications were reviewed. Alternatives were discussed. Monitoring parameters and expected course outlined. We reviewed self quarantine guidelines. Patient to call PCP or go to emergency department if symptoms fail to respond as outlined, or worsen in any way. The patient  agreed with the plan.      Patient Instructions   1. Rest. Drink plenty of fluids.  2. Supportive care as discussed.   3. Salt water gargles three times daily  4. Use humidifier at home when possible.  5. The rapid strep test was negative today.   6. Covid-19/FLU/RSV testing sent to lab.  Self quarantine at this time. If covid-19 test is positive then please follow the listed guidelines below:  Home isolation until:  Your symptoms are improving AND  You are fever free for 24 hours without using fever reducing medications  Resume normal activities, and use added prevention strategies over the next five days.  Clean your hands often  wear a well-fitting mask when around others  keep a distance from others    7. Follow up with PMD in 4-5 days for re-eval. Go to the emergency department immediately if symptoms worsen, change, you develop chest discomfort, wheezing, shortness of breath, or if you have any concerns.

## 2025-03-02 ENCOUNTER — TELEPHONE (OUTPATIENT)
Dept: FAMILY MEDICINE CLINIC | Facility: CLINIC | Age: 35
End: 2025-03-02

## 2025-03-02 LAB
FLUAV + FLUBV RNA SPEC NAA+PROBE: NOT DETECTED
FLUAV + FLUBV RNA SPEC NAA+PROBE: NOT DETECTED
RSV RNA SPEC NAA+PROBE: NOT DETECTED
SARS-COV-2 RNA RESP QL NAA+PROBE: DETECTED

## 2025-04-16 ENCOUNTER — HOSPITAL ENCOUNTER (OUTPATIENT)
Age: 35
Discharge: HOME | End: 2025-04-16
Payer: MEDICAID

## 2025-04-16 VITALS
HEIGHT: 62 IN | OXYGEN SATURATION: 99 % | WEIGHT: 135 LBS | RESPIRATION RATE: 18 BRPM | BODY MASS INDEX: 24.84 KG/M2 | TEMPERATURE: 98 F | DIASTOLIC BLOOD PRESSURE: 69 MMHG | SYSTOLIC BLOOD PRESSURE: 108 MMHG | HEART RATE: 75 BPM

## 2025-04-16 DIAGNOSIS — N39.0 URINARY TRACT INFECTION WITHOUT HEMATURIA, SITE UNSPECIFIED: ICD-10-CM

## 2025-04-16 DIAGNOSIS — R30.0 DYSURIA: Primary | ICD-10-CM

## 2025-04-16 LAB
B-HCG UR QL: NEGATIVE
CLARITY UR: CLEAR
GLUCOSE UR STRIP-MCNC: 250 MG/DL
KETONES UR STRIP-MCNC: 15 MG/DL
NITRITE UR QL STRIP: POSITIVE
PH UR STRIP: 5 [PH]
PROT UR STRIP-MCNC: 100 MG/DL
SP GR UR STRIP: 1.01
UROBILINOGEN UR STRIP-ACNC: 4 MG/DL

## 2025-04-16 PROCEDURE — 81025 URINE PREGNANCY TEST: CPT | Performed by: NURSE PRACTITIONER

## 2025-04-16 PROCEDURE — 99214 OFFICE O/P EST MOD 30 MIN: CPT | Performed by: NURSE PRACTITIONER

## 2025-04-16 PROCEDURE — 81002 URINALYSIS NONAUTO W/O SCOPE: CPT | Performed by: NURSE PRACTITIONER

## 2025-04-16 RX ORDER — CEPHALEXIN 500 MG/1
500 CAPSULE ORAL 2 TIMES DAILY
Qty: 14 CAPSULE | Refills: 0 | Status: SHIPPED | OUTPATIENT
Start: 2025-04-16 | End: 2025-04-23

## 2025-04-17 NOTE — ED PROVIDER NOTES
Patient Seen in: [facility]    History     Chief Complaint   Patient presents with    Urinary Symptoms     Stated Complaint: Eval-g    HPI  34-year-old female no significant past medical history patient is here today with urgency with urination and lower back pain.  Reports that it started over the last couple days.  Denies hematuria, abdominal pain.  Patient is sexually active with no concerns for sexually transmitted infection    Past Medical History[1]    Past Surgical History[2]         Family History[3]    Short Social Hx on File[4]    Review of Systems    Positive for stated complaint: Eval-g  Other systems are as noted in HPI.  Constitutional and vital signs reviewed.      All other systems reviewed and negative except as noted above.    [facility] and agreed except as otherwise stated in HPI.    Physical Exam     ED Triage Vitals [04/16/25 1236]   /69   Pulse 75   Resp 18   Temp 98.4 °F (36.9 °C)   Temp src Oral   SpO2 99 %   O2 Device None (Room air)       Current:/69   Pulse 75   Temp 98.4 °F (36.9 °C) (Oral)   Resp 18   Ht 157.5 cm (5' 2\")   Wt 61.2 kg   LMP 04/10/2025 (Approximate)   SpO2 99%   BMI 24.69 kg/m²   Adult physical exam:     VS: Vital signs reviewed. O2 saturation within normal limits for this patient     General: Patient is awake and alert, oriented to person, place and time. Not in acute distress.      HEENT: Head is normocephalic atraumatic. Pupils reactive bilaterally.  EOMs intact. .    Lungs: No respiratory distress noted    Abdomen: Soft, nontender, nondistended.  Active bowel sounds present.       Back: No CVA tenderness.     Extremities: No edema.  Pulses 2+ extremities.   Brisk capillary refill noted.      Skin: Normal skin turgor     CNS: Moves all 4 extremities.  Interacts appropriately.  No tremor.  No gait abnormality    Differential diagnosis: UTI, Thai, kidney stone        ED Course     Labs Reviewed   [facility] POCT URINALYSIS  DIPSTICK - Abnormal; Notable for the following components:       Result Value    Urine Color Red (*)     Protein urine 100 (*)     Glucose, Urine 250 (*)     Ketone, Urine 15 (*)     Bilirubin, Urine Small (*)     Blood, Urine Trace-Intact (*)     Nitrite Urine Positive (*)     Urobilinogen urine 4.0 (*)     Leukocyte esterase urine Large (*)     All other components within normal limits   URINE CULTURE, ROUTINE - Abnormal; Notable for the following components:    Urine Culture 50,000-99,000 CFU/ML Citrobacter koseri (*)     All other components within normal limits   POCT PREGNANCY URINE - Normal           I have personally  reviewed available prior medical records for any recent pertinent discharge summaries/testing. Patient/family updated on results and plan, a verbalized understanding and agreement with the plan.  I explained to the patient that emergent conditions may arise and to go to the ER for new, worsening or any persistent conditions. I've explained the importance of taking all medicatons as prescribed, follow up, and return precuations,  All questions answered.  MDM   Patient presents  for dysuria.  History and physical exam as above.  No suprapubic tenderness without other abdominal tenderness or flank pain.  Afebrile, nontoxic and does not meet SIRS criteria.  UA consistent with UTI.  Denies vaginal discharge or new sexual contacts, no concerns for STDs.  Will treat with antibiotics and Pyridium.  Recommend follow-up with PCP.  Counseled on hygiene and preventative measures.  Return to ED precautions discussed with the patient who verbalized understanding.        Disposition and Plan     Clinical Impression:  1. Dysuria    2. Urinary tract infection without hematuria, site unspecified        Disposition:  Discharge    Follow-up:  No follow-up provider specified.    Medications Prescribed:  Discharge Medication List as of 4/16/2025  1:15 PM        START taking these medications    Details   cephALEXin  500 MG Oral Cap Take 1 capsule (500 mg total) by mouth 2 (two) times daily for 7 days., Normal, Disp-14 capsule, R-0                              [1] History reviewed. No pertinent past medical history.  [2] History reviewed. No pertinent surgical history.  [3] No family history on file.  [4]   Social History  Socioeconomic History    Marital status: Single   Tobacco Use    Smoking status: Former     Types: Cigarettes    Smokeless tobacco: Never   Vaping Use    Vaping status: Some Days   Substance and Sexual Activity    Alcohol use: Not Currently    Drug use: Never     Social Drivers of Health     Food Insecurity: Not on File (9/26/2024)    Received from [facility]    Food [employer]     Food: 0   Transportation Needs: Low Risk  (5/29/2024)    Received from [facility]    Transportation Needs     Do you have trouble getting transportation to medical appointments?: No     How do you normally get to and from your appointments?: Car Service (taxi, Uber, Lyft)

## (undated) NOTE — LETTER
Date: 3/2/2025    Patient Name: Clarisse Amezquita          To Whom it may concern:     The above patient was seen at [facility] for treatment of a medical condition. Please excuse her absences this week. She can return to work once she is feeling better and is fever free for 24hrs without the use of fever-reducing medications.          Sincerely,    Chito Dial NP

## (undated) NOTE — LETTER
Date: 4/17/2023    Patient Name: Chelle Loya          To Whom it may concern: This letter has been written at the patient's request. The above patient was seen at the [facility] for treatment of a medical condition. Please excuse her absence.          Sincerely,    Eyal Ford PA

## (undated) NOTE — LETTER
Date: 3/16/2022    Patient Name: Trenton Mercer          To Whom it may concern: This letter has been written at the patient's request. The above patient was seen at the [facility] for treatment of strep throat. Return to work after 24 hrs of antibiotics and fever free for 24 hrs and feeling well.        Sincerely,    ASHLEE Ortiz

## (undated) NOTE — LETTER
Date & Time: 5/31/2023, 12:12 PM  Patient: Geovani Parker  Encounter Provider(s):    Hu Ramirez PA-C       To Whom It May Concern:    Angelito Quigley was seen and treated in our department on 5/31/2023. She should not return to work until 6/2/23 .     If you have any questions or concerns, please do not hesitate to call.        _____________________________  Physician/APC Signature

## (undated) NOTE — LETTER
Date & Time: 2/17/2023, 6:34 PM  Patient: Malini Gilliam  Encounter Provider(s):    Corrinne Aw, APRN       To Whom It May Concern:    Sylwia Cosme was seen and treated in our department on 2/17/2023. She should not return to work until February 19, or sooner if feeling better.     If you have any questions or concerns, please do not hesitate to call.        _____________________________  Physician/APC Signature

## (undated) NOTE — LETTER
Date: 4/17/2023    Patient Name: Almas Amanda          To Whom it may concern: This letter has been written at the patient's request. The above patient was seen at the [facility] for treatment of a medical condition. Please excuse her absence. Return to work when feeling better and vomiting and diarrhea have resolved.          Sincerely,    ASHLEE Galaviz

## (undated) NOTE — LETTER
Date & Time: 9/3/2021, 1:35 PM  Patient: Qing King  Encounter Provider(s):    ASHLEE Don       To Whom It May Concern:    Ifrah Saldivar was seen and treated in our department on 9/3/2021.      If you have any questions or concerns, please